# Patient Record
Sex: FEMALE | Race: WHITE | URBAN - METROPOLITAN AREA
[De-identification: names, ages, dates, MRNs, and addresses within clinical notes are randomized per-mention and may not be internally consistent; named-entity substitution may affect disease eponyms.]

---

## 2022-06-03 ENCOUNTER — OFFICE VISIT (OUTPATIENT)
Dept: URBAN - METROPOLITAN AREA CLINIC 17 | Facility: CLINIC | Age: 52
End: 2022-06-03
Payer: COMMERCIAL

## 2022-06-03 DIAGNOSIS — H52.13 MYOPIA, BILATERAL: ICD-10-CM

## 2022-06-03 DIAGNOSIS — H25.13 AGE-RELATED NUCLEAR CATARACT, BILATERAL: ICD-10-CM

## 2022-06-03 DIAGNOSIS — H40.1334 PIGMENTARY GLAUCOMA, BILATERAL, INDETERMINATE STAGE: Primary | ICD-10-CM

## 2022-06-03 DIAGNOSIS — H18.053 BILATERAL KRUKENBERG'S SPINDLE: ICD-10-CM

## 2022-06-03 PROCEDURE — 99204 OFFICE O/P NEW MOD 45 MIN: CPT | Performed by: OPTOMETRIST

## 2022-06-03 ASSESSMENT — KERATOMETRY
OD: 49.50
OS: 49.50

## 2022-06-03 ASSESSMENT — INTRAOCULAR PRESSURE
OD: 15
OS: 17

## 2022-06-03 ASSESSMENT — VISUAL ACUITY
OD: 20/20
OS: 20/20

## 2022-06-03 NOTE — IMPRESSION/PLAN
Impression: Pigmentary glaucoma, bilateral, indeterminate stage: S76.7258. IOPs stable w/o treatment Maricel Freeman OU Previously on [facility]  Plan: Discussed diagnosis in detail with patient. No treatment is required at this time. Will continue to monitor IOP. F/u 1yr.